# Patient Record
Sex: FEMALE
[De-identification: names, ages, dates, MRNs, and addresses within clinical notes are randomized per-mention and may not be internally consistent; named-entity substitution may affect disease eponyms.]

---

## 2022-12-29 ENCOUNTER — NON-APPOINTMENT (OUTPATIENT)
Age: 32
End: 2022-12-29

## 2022-12-31 ENCOUNTER — APPOINTMENT (OUTPATIENT)
Dept: ORTHOPEDIC SURGERY | Facility: CLINIC | Age: 32
End: 2022-12-31
Payer: COMMERCIAL

## 2022-12-31 VITALS — WEIGHT: 148 LBS | BODY MASS INDEX: 26.22 KG/M2 | HEIGHT: 63 IN

## 2022-12-31 DIAGNOSIS — Z78.9 OTHER SPECIFIED HEALTH STATUS: ICD-10-CM

## 2022-12-31 PROBLEM — Z00.00 ENCOUNTER FOR PREVENTIVE HEALTH EXAMINATION: Status: ACTIVE | Noted: 2022-12-31

## 2022-12-31 PROCEDURE — 99203 OFFICE O/P NEW LOW 30 MIN: CPT

## 2022-12-31 PROCEDURE — 73660 X-RAY EXAM OF TOE(S): CPT | Mod: LT

## 2022-12-31 NOTE — DISCUSSION/SUMMARY
[de-identified] :  At this time I buddy tape the toes, I gave her script for a Darco shoe.  She can wear that until she is comfortable putting on a regular shoe.  Warm soaks Epsom salts and anti-inflammatories as needed.  Will see her back in about a month for repeat x-rays and evaluation. Patient will call me if any other problems or concerns.  Patient verbalized understanding and agreed with the plan, all questions were answered in the office today.\par

## 2022-12-31 NOTE — IMAGING
[de-identified] :   Upon examination of her left foot she has swelling and ecchymosis of the 2nd toe.  She is very tender to palpation over the middle phalanx, mildly tender over the distal and proximal phalanx.  Injury to the nail.  She is nontender palpation to the rest of the toes.  She is nontender over the MTP joint or the metatarsals.  She is nontender over the Lisfranc joint of the midfoot.  No tenderness palpation of the ankle.  No calf tenderness.  She has good range of motion of the ankle, decreased range of motion of the toes due to pain.  Sensation is intact throughout, 2+ DP and PT pulses.\par \par X-rays taken the office today of the left 2nd toe show a nondisplaced middle phalanx fracture, no other fractures, dislocations, or other bony abnormalities noted.\par
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
## 2023-02-03 ENCOUNTER — APPOINTMENT (OUTPATIENT)
Dept: ORTHOPEDIC SURGERY | Facility: CLINIC | Age: 33
End: 2023-02-03
Payer: COMMERCIAL

## 2023-02-03 VITALS — BODY MASS INDEX: 26.22 KG/M2 | HEIGHT: 63 IN | WEIGHT: 148 LBS

## 2023-02-03 DIAGNOSIS — S92.525A NONDISPLACED FRACTURE OF MIDDLE PHALANX OF LEFT LESSER TOE(S), INITIAL ENCOUNTER FOR CLOSED FRACTURE: ICD-10-CM

## 2023-02-03 PROCEDURE — 99213 OFFICE O/P EST LOW 20 MIN: CPT

## 2023-02-03 PROCEDURE — 73660 X-RAY EXAM OF TOE(S): CPT | Mod: LT
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
## 2023-02-03 NOTE — DISCUSSION/SUMMARY
[de-identified] :   At this time she can continue buddy taping the toes as needed for comfort.  No high impact activity for another month.  I will see her back in 6 weeks for repeat x-rays and evaluation. Patient will call me if any other problems or concerns.  Patient verbalized understanding and agreed with the plan, all questions were answered in the office today.\par

## 2023-02-03 NOTE — IMAGING
[de-identified] : Upon examination of her left foot she has mild swelling of the 2nd toe.  She is mildly tender to palpation over the middle phalanx, no tenderness over the distal and proximal phalanx.  No injury to the nail.  She is nontender palpation to the rest of the toes.  She is nontender over the MTP joint or the metatarsals.  She is nontender over the Lisfranc joint of the midfoot.  No tenderness palpation of the ankle.  No calf tenderness.  She has good range of motion of the ankle, she can flex and extend the toes.  Sensation is intact throughout, 2+ DP and PT pulses.\par \par X-rays taken the office today of the left 2nd toe show a nondisplaced middle phalanx fracture, alignment unchanged. Healing well.

## 2023-03-17 ENCOUNTER — APPOINTMENT (OUTPATIENT)
Dept: ORTHOPEDIC SURGERY | Facility: CLINIC | Age: 33
End: 2023-03-17